# Patient Record
Sex: FEMALE | Race: WHITE
[De-identification: names, ages, dates, MRNs, and addresses within clinical notes are randomized per-mention and may not be internally consistent; named-entity substitution may affect disease eponyms.]

---

## 2020-09-17 ENCOUNTER — HOSPITAL ENCOUNTER (EMERGENCY)
Dept: HOSPITAL 96 - M.ERS | Age: 40
Discharge: HOME | End: 2020-09-17
Payer: COMMERCIAL

## 2020-09-17 VITALS — HEIGHT: 65 IN | BODY MASS INDEX: 41.65 KG/M2 | WEIGHT: 250 LBS

## 2020-09-17 VITALS — DIASTOLIC BLOOD PRESSURE: 91 MMHG | SYSTOLIC BLOOD PRESSURE: 144 MMHG

## 2020-09-17 DIAGNOSIS — Z88.5: ICD-10-CM

## 2020-09-17 DIAGNOSIS — K80.20: ICD-10-CM

## 2020-09-17 DIAGNOSIS — K29.70: Primary | ICD-10-CM

## 2020-09-17 DIAGNOSIS — Z88.8: ICD-10-CM

## 2020-09-17 DIAGNOSIS — Z90.89: ICD-10-CM

## 2020-09-17 LAB
ABSOLUTE BASOPHILS: 0.1 THOU/UL (ref 0–0.2)
ABSOLUTE EOSINOPHILS: 0.1 THOU/UL (ref 0–0.7)
ABSOLUTE MONOCYTES: 0.8 THOU/UL (ref 0–1.2)
ALBUMIN SERPL-MCNC: 3.3 G/DL (ref 3.4–5)
ALP SERPL-CCNC: 87 U/L (ref 46–116)
ALT SERPL-CCNC: 32 U/L (ref 30–65)
ANION GAP SERPL CALC-SCNC: 6 MMOL/L (ref 7–16)
AST SERPL-CCNC: 15 U/L (ref 15–37)
BASOPHILS NFR BLD AUTO: 1 %
BILIRUB SERPL-MCNC: 0.2 MG/DL
BILIRUB UR-MCNC: NEGATIVE MG/DL
BUN SERPL-MCNC: 17 MG/DL (ref 7–18)
CALCIUM SERPL-MCNC: 8.5 MG/DL (ref 8.5–10.1)
CHLORIDE SERPL-SCNC: 104 MMOL/L (ref 98–107)
CO2 SERPL-SCNC: 30 MMOL/L (ref 21–32)
COLOR UR: YELLOW
CREAT SERPL-MCNC: 0.9 MG/DL (ref 0.6–1.3)
EOSINOPHIL NFR BLD: 1.2 %
GLUCOSE SERPL-MCNC: 95 MG/DL (ref 70–99)
GRANULOCYTES NFR BLD MANUAL: 58.6 %
HCT VFR BLD CALC: 35.2 % (ref 37–47)
HGB BLD-MCNC: 12 GM/DL (ref 12–15)
KETONES UR STRIP-MCNC: NEGATIVE MG/DL
LIPASE: 142 U/L (ref 73–393)
LYMPHOCYTES # BLD: 2.5 THOU/UL (ref 0.8–5.3)
LYMPHOCYTES NFR BLD AUTO: 29.9 %
MCH RBC QN AUTO: 27.1 PG (ref 26–34)
MCHC RBC AUTO-ENTMCNC: 34.2 G/DL (ref 28–37)
MCV RBC: 79.3 FL (ref 80–100)
MONOCYTES NFR BLD: 9.3 %
MPV: 8.9 FL. (ref 7.2–11.1)
NEUTROPHILS # BLD: 4.9 THOU/UL (ref 1.6–8.1)
NUCLEATED RBCS: 0 /100WBC
PLATELET COUNT*: 287 THOU/UL (ref 150–400)
POTASSIUM SERPL-SCNC: 3.2 MMOL/L (ref 3.5–5.1)
PROT SERPL-MCNC: 7.2 G/DL (ref 6.4–8.2)
PROT UR QL STRIP: NEGATIVE
RBC # BLD AUTO: 4.44 MIL/UL (ref 4.2–5)
RBC # UR STRIP: (no result) /UL
RDW-CV: 14.9 % (ref 10.5–14.5)
SODIUM SERPL-SCNC: 140 MMOL/L (ref 136–145)
SP GR UR STRIP: 1.02 (ref 1–1.03)
URINE CLARITY: CLEAR
URINE GLUCOSE-RANDOM: NEGATIVE
URINE LEUKOCYTES-REFLEX: NEGATIVE
URINE NITRITE-REFLEX: NEGATIVE
UROBILINOGEN UR STRIP-ACNC: 0.2 E.U./DL (ref 0.2–1)
WBC # BLD AUTO: 8.4 THOU/UL (ref 4–11)

## 2020-09-18 NOTE — EKG
[place] [address]
Phone:  [phone]                     ELECTROCARDIOGRAM REPORT      
_______________________________________________________________________________
 
Name:         MEG WETZEL             Room:                     [facility]#:    [id number]     Account #:     [id number]  
Admission:    20    Attend Phys:                     
Discharge:    20    Date of Birth: [birth year]  
Date of Service: 20  Report #:      [id number]
        [id number]
_______________________________________________________________________________
THIS REPORT FOR:  //name//                      
 
                         [facility] ED
                                       
Test Date:    2020               Test Time:    19:19:30
Pat Name:     MEG WETZEL          Department:   
Patient ID:   [id number]            Room:          
Gender:                               Technician:   [place] [id number]
:          1980               Requested By: Kasey Murphy
Order Number: [id number] MD:   Malachi Kim
                                 Measurements
Intervals                              Axis          
Rate:         77                       P:            -16
AZ:           134                      QRS:          42
QRSD:         95                       T:            31
QT:           422                                    
QTc:          478                                    
                           Interpretive Statements
Sinus rhythm
Compared to ECG 2017 10:28:21
No significant changes
Electronically Signed On 2020 10:37:11 CDT by Malachi Kim
[email or web address]
 
 
 
 
 
 
 
 
 
 
 
 
 
 
 
 
 
 
 
 
 
  <ELECTRONICALLY SIGNED>
                                           By: Malachi Kim MD, FAC     
  20     [facility]
D: 20   _____________________________________
T: 20   Malachi Kim MD, [facility]       /EPI

## 2020-09-25 ENCOUNTER — HOSPITAL ENCOUNTER (OUTPATIENT)
Dept: HOSPITAL 96 - M.LAB | Age: 40
End: 2020-09-25
Payer: COMMERCIAL

## 2020-09-25 DIAGNOSIS — Z20.828: ICD-10-CM

## 2020-09-25 DIAGNOSIS — K80.20: ICD-10-CM

## 2020-09-25 DIAGNOSIS — Z01.812: Primary | ICD-10-CM

## 2020-09-30 ENCOUNTER — HOSPITAL ENCOUNTER (OUTPATIENT)
Dept: HOSPITAL 96 - M.SUR | Age: 40
Discharge: HOME | End: 2020-09-30
Payer: COMMERCIAL

## 2020-09-30 DIAGNOSIS — R10.11: ICD-10-CM

## 2020-09-30 DIAGNOSIS — E07.9: ICD-10-CM

## 2020-09-30 DIAGNOSIS — Z88.8: ICD-10-CM

## 2020-09-30 DIAGNOSIS — Z79.899: ICD-10-CM

## 2020-09-30 DIAGNOSIS — Z98.890: ICD-10-CM

## 2020-09-30 DIAGNOSIS — K80.10: Primary | ICD-10-CM

## 2020-09-30 NOTE — H
[address]  [address]                    HISTORY AND PHYSICAL          
_______________________________________________________________________________
 
Name:       RASHARDMEG DAMIAN        Room:                      [facility].#:  [id number]      Account #:      [id number]  
Admission:  20     Attend Phys:    Debbie Dale MD   
Discharge:               Date of Birth:  [birth year]  
         Report #: [id number]
                                                                     [id number]  
_______________________________________________________________________________
THIS REPORT FOR:  //name//                      
 
cc:  JEB - No family physician/PCP 
     JEB - No family physician/PCP                                        ~
CC: [facility] physician/PCP
    Debbie Dale
 
ADMITTING DIAGNOSIS:  Biliary colic.
 
HISTORY OF PRESENT ILLNESS:  The patient is a 40-year-old female who presents to
my office with complaints of nausea, intermittent abdominal pain radiating to
her back.  She was worked up by her primary care physician and it was found to
have cholelithiasis and was referred for surgical management.
 
PAST MEDICAL HISTORY:  Includes thyroid disease.
 
PAST SURGICAL HISTORY:  Includes a , foot surgery and tonsillectomy.
 
CURRENT MEDICATIONS:  Levothyroxine and sucralfate.
 
ALLERGIES:  SHE HAS ALLERGIES TO CODEINE AND TRAMADOL.
 
SOCIAL HISTORY:  She does not smoke.  She drinks 5 drinks per week.  She does
not use illegal drugs.
 
FAMILY HISTORY:  Noted for diabetes, high blood pressure, obesity and cancer.
 
REVIEW OF SYSTEMS:  Does report nausea and pain, but no vomiting.  She denies
any cough.
 
PHYSICAL EXAMINATION:
GENERAL:  Modestly obese well-developed female.
HEAD, EARS, EYES, NOSE AND THROAT:  Unremarkable.  Sclerae are anicteric.
NECK:  Supple.
LUNGS:  Clear.
CARDIAC:  Regular rate and rhythm.
ABDOMEN:  Soft with some mild right upper quadrant tenderness, but no guarding
or fullness.
NEUROLOGIC:  Normal size.  Normal thyroid oriented to person, place and time.
 
IMPRESSION AND PLAN:  Symptomatic biliary colic with cholelithiasis.  I have
outlined laparoscopic cholecystectomy, its risks and benefits including
 
 
 
[place] [address]                    HISTORY AND PHYSICAL          
_______________________________________________________________________________
 
Name:       MEG WETZEL        Room:                      [facility]#:  [id number]      Account #:      [id number]  
Admission:  20     Attend Phys:    Debbie Dale MD   
Discharge:               Date of Birth:  [birth year]  
         Report #: [id number]
                                                                     [id number]  
_______________________________________________________________________________
bleeding, infection, bile duct injury.  She understands the risks and benefits
and wishes to proceed with surgical treatment.
 
 
 
 
 
 
 
 
 
 
 
 
 
 
 
 
 
 
 
 
 
 
 
 
 
 
 
 
 
 
 
 
 
 
 
 
 
 
 
 
 
 
<ELECTRONICALLY SIGNED>
                                        By:  Debbie Dale MD            
20     1313
D: 20 1610_______________________________________
T: 20 1634Debbie Dale MD               /nt

## 2020-10-02 NOTE — PATH
[address]  [address]                    PATHOLOGY RPT PROCEDURE       
_______________________________________________________________________________
 
Name:       NARDA WETZEL        Room:                      [facility].#:  [id number]      Account #:      [id number]  
Admission:  09/30/20     Date of Birth:  [birth year]  
Discharge:                             Report #:    [id number]
                                                         Path Case #: [id number]
_______________________________________________________________________________
 
[facility] Accession Number: [id number]
.                                                                01
Material submitted:                                        .
gallbladder - GALLBLADDER
.                                                                01
Clinician provided ICD-10:
K80.20
.                                                                02
**********************************************************************
Diagnosis:
Gallbladder:
- Chronic cholecystitis, cholesterolosis and cholelithiasis.
(BRINA/db; 10/02/2020)
LBQ  10/02/2020  1327 Local
**********************************************************************
.                                                                02
Electronically signed:                                     .
Brando Logan MD, Pathologist
NPI- [id number]
.                                                                01
Gross description:                                         .
The specimen is received in formalin labeled "Chantel, Narda,
gallbladder" and consists of an intact green gallbladder measuring 7.3 x
2.6 x 2.3 cm.  The margin is inked black.  Opening reveals a lumen filled
with viscous green bile and multiple gravel-like black calculi measuring
between 0.1-0.6 cm.  The mucosa is green with yellow stippling and an
average wall thickness of 0.1 cm.  No masses are identified.
Representative sections are submitted in A1.
(SDY; 10/1/2020)
SYU/SYU  10/01/2020  [facility]1 Local
.                                                                02
Pathologist provided ICD-10:
K80.10, K82.4
.                                                                02
CPT                                                        .
369281
Specimen Comment: A courtesy copy of this report has been sent to [phone]
Specimen Comment: Report sent to 
***Performed at:  01
   [address], [place], [place]  [id number]
   MD Malcolm Viera MD Phone:  [phone]
***Performed at:  02
   [place] [address] [place], [place]  [id number]
   MD Brando Logan MD Phone:  [phone]

## 2020-10-09 NOTE — OP
[address]  [address]                    OPERATIVE REPORT              
_______________________________________________________________________________
 
Name:       RASHARDMEGE        Room:                      [facility].#:  [id number]      Account #:      [id number]  
Admission:  09/30/20     Attend Phys:    Debbie Dale MD   
Discharge:               Date of Birth:  [birth year]  
         Report #: [id number]
                                                                     [id number]  
_______________________________________________________________________________
THIS REPORT FOR:  //name//                      
 
cc:  JEB - No family physician/PCP 
     JEB - No family physician/PCP                                        ~
CC: [facility] physician/PCP
    Debbie Dale
 
DATE OF SERVICE:  09/30/2020
 
 
PREOPERATIVE DIAGNOSIS:  Chronic cholecystitis.
 
POSTOPERATIVE DIAGNOSIS:  Chronic cholecystitis.
 
OPERATIVE PROCEDURE:  Laparoscopic cholecystectomy.
 
ANESTHESIA:  General endotracheal.
 
OPERATIVE FINDINGS:  Gallbladder with some adhesions at the base of the
gallbladder.
 
DESCRIPTION OF PROCEDURE:  The patient was placed under general endotracheal
anesthesia and the abdomen was prepped and draped in our usual sterile fashion. 
Time-out was taken.  IV antibiotics administered.  I began by infiltrating in
the infraumbilical crease and placed a transverse incision there with a #15
scalpel blade as I just lifted with the aid of cautery and S retractors
dissected through subcutaneous fat down to the anterior abdominal wall.  These
were grasped with 2 Kochers and tented upward and cleared with cautery and then
transversely transected with a #15 scalpel blade.  Blunt dissection with a
hemostat and then finger dissection into the peritoneal cavity was accomplished
without injury to underlying abdominal viscera.  An 0 PDS was looped through
this incision site and a size 12 applied.  Medical Pat trocar was passed into
the peritoneal cavity and secured at the skin.  Insufflation with carbon dioxide
to 5 liters was completed and a 5 mm 30-degree scope was inserted.  The right
upper quadrant was inspected under direct visualization,  I infiltrated in the
subcostal margin at the epigastric, midclavicular and anterior axillary line, 3
small stab incisions with a #15 scalpel blades were placed at those locations
and then tunneling with bladeless, applied medical Pat trocar and passed into
the peritoneal cavity under direct visualization and secured at the skin.  Using
grasping forceps, I grasped the fundus and Erendira pouch and tented it towards
the diaphragm.  Reverse Trendelenburg and left lateral decubitus position was
accomplished.  At the base of the gallbladder, portion of the duodenum was stuck
on the Erendira's pouch.  This was carefully teased off with a [place]
dissector and then I carefully teased away the subcutaneous tissues surrounding
these structures including the cystic duct and cystic artery.  I carefully saw
at the gallbladder fundus bent on itself and as I was able to dissect the scar
 
 
 
[place] [address]                    OPERATIVE REPORT              
_______________________________________________________________________________
 
Name:       MEG WETZEL        [id number]      Account #:      [id number]  
Admission:  09/30/20     Attend Phys:    Debbie Dale MD   
Discharge:               Date of Birth:  [birth year]  
         Report #: [id number]
                                                                     [id number]  
_______________________________________________________________________________
tissue away, I was able to encircle both the cystic duct and the cystic artery
and placed 3 Hemoclips across both structures and divided them both with
laparoscopic scissors.  Handheld cautery was then used to dissect the
gallbladder off of the undersurface of the liver.  I changed the position of the
camera and placed it in the epigastric port and through the umbilical port an
Endopouch was fed into the abdomen, opened the gallbladder placed in that pouch
and the pouch was closed.  I removed the Pat trocar, and with string in hand
and pouch retrieved the gallbladder with Endopouch.  The Pat was replaced. 
Pneumoperitoneum was reestablished and I placed the camera from the umbilical
port site towards the subhepatic space, teasing the underlying tissues.  The
areas that were dissected still had its clips in place.  There was no active
blood or bile leaking from those sites.  It was irrigated and aspirated and
there was no active bleeding or bile leakage seen.  I then removed the
laparoscopic instruments, deflated pneumoperitoneum and removed the laparoscopic
trocars.  The umbilical site 0 PDS was tied securely and infiltrated with
Marcaine.  All four incisions were then closed with buried 4-0 PDS sutures and
sealed with Dermabond.  Estimated blood loss was 3 mL.  Sponge and instrument
counts correct.  Specimen to pathology and the patient was extubated, returned
to recovery in stable condition.
 
 
 
 
 
 
 
 
 
 
 
 
 
 
 
 
 
 
 
 
 
 
 
 
 
<ELECTRONICALLY SIGNED>
                                        By:  Debbie Dale MD            
10/09/20     1343
D: 09/30/20 1444_______________________________________
T: 09/30/20 1519Debbie Dale MD               /nt